# Patient Record
(demographics unavailable — no encounter records)

---

## 2025-05-21 NOTE — REASON FOR VISIT
[Initial Consultation] : an initial consultation for [FreeTextEntry2] : Grave's disease, deviated septum

## 2025-05-21 NOTE — PHYSICAL EXAM
[] : septum deviated to the right [Normal] : mucosa is normal [Midline] : trachea located in midline position [de-identified] : right conchal erythema [de-identified] : +modified Canadian's maneuver bilaterally

## 2025-05-21 NOTE — HISTORY OF PRESENT ILLNESS
[de-identified] : 25 y/o F with known Graves disease presents to [facility] care. Previously managed by outside primary care, labs every 3 months. Was due for repeat thyroid labs in February but has not had this done. Last ultrasound December 2024, does not have results. No prior records brought. Takes methimazole 5 mg QD and propanolol 20 mg for palpitations. History of syncopal episode, reportedly EKG was normal. Continues to have intermittent palpations. Sometimes right sided chest heaviness. Is concerned that her medication dosing might need to be changed.   Also complains of nasal congestion and known deviated septum. Has not tried anything for her nose. Right>left. Also chronic nasal drainage. Denies history of sinusitis. Previously was interested in cosmetic changes.

## 2025-05-21 NOTE — ASSESSMENT
[FreeTextEntry1] : nasal obstruction -nasal steroid spray trial -xyzal  history of grave's disease -ultrasound -thyroid labs -endocrinology referral (urgent) - network care emailed  atopic dermatitis - right yaron -mometasone ointment bid 5 days  Follow up in 6 weeks regarding nose

## 2025-05-21 NOTE — PROCEDURE
[FreeTextEntry6] : - Procedure: Bilateral nasal endoscopy Pre-operative Diagnosis: Chronic nasal congestion Post-operative Diagnosis: Right septal deviation, turbinate hypertrophy Anesthesia: Topical lidocaine and topical oxymetazoline   Procedure Details: After topical anesthesia and decongestant, the patient was placed in the supine position. The endoscope was passed along the left nasal floor to the nasopharynx. It was then passed into the region of the middle meatus, middle turbinate, and the sphenoethmoid region. An identical procedure was performed on the right side.   Findings: Mucosa:   normal Nasal septum: midline Discharge:  none Turbinates:  normal Adenoids:   normal Posterior choanae:  normal Eustachian tube orifices:  patent Mucous stranding:  normal Lesions:   Not present

## 2025-06-11 NOTE — HISTORY OF PRESENT ILLNESS
[de-identified] : 25 y/o F with graves disease, deviated septum, PCOS?, presenting to establish care. + occasional BL wrist and ankle pain. Also some neck muscular pain. Better with heat. No morning stiffness + rare intermittent palpitations, no specific associated factors  Sx Hx: lasik   Family Hx: Mom, sister- thyroid cancer. Mom- CVA. Dad- HTN   Social Hx: never smoker, no alcohol or illicit drugs. 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

## 2025-06-11 NOTE — PHYSICAL EXAM
[No Acute Distress] : no acute distress [Well-Appearing] : well-appearing [No Lymphadenopathy] : no lymphadenopathy [No Accessory Muscle Use] : no accessory muscle use [Clear to Auscultation] : lungs were clear to auscultation bilaterally [No Respiratory Distress] : no respiratory distress  [Normal Rate] : normal rate  [Regular Rhythm] : with a regular rhythm [Soft] : abdomen soft [Coordination Grossly Intact] : coordination grossly intact [Non Tender] : non-tender [Non-distended] : non-distended [Normal Affect] : the affect was normal [Normal Insight/Judgement] : insight and judgment were intact [de-identified] : no hand swelling

## 2025-06-11 NOTE — HEALTH RISK ASSESSMENT
[0] : 2) Feeling down, depressed, or hopeless: Not at all (0) [Never] : Never [PHQ-2 Negative - No further assessment needed] : PHQ-2 Negative - No further assessment needed [[id number]] : 0

## 2025-06-11 NOTE — ASSESSMENT
[FreeTextEntry1] : 27 y/o F with graves disease, deviated septum, PCOS?, presenting to establish care. HPI as above.  # graves disease # family Hx thyroid cancer  - recent labs reviewed; c/w current methimazole and BB - US pending - endo eval scheduled  # deviated septum - f/u ENT  # PCOS? - endo/GYN f/u  # polyarthralgia - BL wrist and ankle pain. Also some neck muscular pain - f/u labs - hot pack, nsaids prn  # intermittent palpitations - also notes syncopal episode when graves was uncontrolled - cardio eval for monitor  # HCM - f/u labs, pre-employment labs - GYN  f/u pending above w/u [Vaccines Reviewed] : Immunizations reviewed today. Please see immunization details in the vaccine log within the immunization flowsheet.

## 2025-07-11 NOTE — ASSESSMENT
[FreeTextEntry1] : Problems Assessed: 1. Palpitations  2. Thyroid disease  3. Family history of Cardiomyopathy in first degree relative.  Plan: - Event monitor x14 days  - Echocardiogram   Follow-up: 1 month  ----------------------------------------------------------------------------------------- Billing Statement:45 minutes. This includes non-face-to-face time before the visit when I reviewed relevant portions of the patient's medical record and time spent on documentation after the visit. During face-to-face time, I took a relevant history, examined the patient and explained differential diagnoses, relevant cardiac diagnoses, workup, and management plan.

## 2025-07-11 NOTE — REASON FOR VISIT
[FreeTextEntry1] : PCP/Referring Doctor: Dr Neto Wong  Chief Complaint: "she has palpitations " ------------------------------------------------------------------------------------------------------------------------------------ History of Present Illness:  26F hyperthyroidism, with palpitations Reports a family history of cardiomyopathy in mother- has not have screening echocardiogram.   ROS:  chest pain (-), dyspnea with exertion (-), orthopnea (-), edema (-), palpitations (+), dizziness (-) All other systems were reviewed and are negative. ------------------------------------------------------------------------------------------------------------------------------------ Past Medical History: Any history of HTN? N Any history of Lipid disorders? N TG 65 LDL 84 Any history Diabetes or Prediabetes? Yes prediabetes HbA1c 5.3% Any history of MI, stroke or TIA? No  Any prior Stress Testing? No Any prior Cath/Angiogram procedure? No Any prior Echocardiogram (TTE)? No Any prior vascular study? No Have patient been on blood thinners? No Have patient had cardiac or vascular surgeries? No ------------------------------------------------------------------------------------------------------------------------------------ Patient's current cardiovascular medications were reviewed and updated in chart. yes ------------------------------------------------------------------------------------------------------------------------------------ Family history Do you have a family history of heart attacks and/or stroke before the age of 60? Mom, Dad both in their 40s Do you have a family history of cardiac arrest? No ------------------------------------------------------------------------------------------------------------------------------------ Social History: Do you currently or previously used tobacco including cigarettes and vapes? No How many alcoholic drinks per week? 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

## 2025-07-30 NOTE — ASSESSMENT
[FreeTextEntry1] : Hyperthyroidism: Grave's disease Clinically Euthyroid Current Medication Dosage: 5mg methimazole  follow up repeat TFTs. f/u CMP Hx of thyroid nodules? yes right single subcentimeter nodule, hypervascularity Spoke to patient about course of achieving euthyroid state. Then determine possibility of remission or need for GALEANO ablation vs thyroidectomy. Risks of hyperthyroidism explained, and importance of medication compliance emphasized.

## 2025-07-30 NOTE — HISTORY OF PRESENT ILLNESS
[FreeTextEntry1] :   HPI: 27 year old female presenting for Grave's disease.    PMHx: Grave's    Allergies: NKDA     THYROID DISEASE HISTORY:   Risk Factors:   Family or Personal Hx of thyroid disease? was diagnosed in 2109 with Grave's was treated and was in remission, was pregnant in 2022 and symptoms had return postpartum.   Goiter or Hx of Goiter? no goiter but reports hx of nodules , subcentimeter on left, enlarged hyper vascular thyroid  Prior of current use of thyroid medication?  currently 5mg methimazole  Hx of autoimmune disease? Grave's disease in herself and sister  Recent iodine exposure? denies    Clinical Symptoms: Hyperthyroidism:  joint aches, was having palpitations but better, slight tremor, anxiety at time, denies insomnia, really hungry      LIFESTYLE FACTORS:   Eating patterns and weight history: stale weight stable on methimazole  Activity/Sleep: doesn't work out       Follow up care: PCP: Dr. Wong     Surgical History: Lasik    Family History: DM- Thyroid- Autoimmune- Cancer- Other- MI and stroke    Social History: occupation- 2 jobs  support system-  ETOH use- no Tobacco Hx- no Additional substance use-   Additional Medications: OTC vitamins and supplements: none   Pregnancy Planning? 2023 had baby, not planning right now